# Patient Record
Sex: MALE | Race: BLACK OR AFRICAN AMERICAN | Employment: OTHER | URBAN - METROPOLITAN AREA
[De-identification: names, ages, dates, MRNs, and addresses within clinical notes are randomized per-mention and may not be internally consistent; named-entity substitution may affect disease eponyms.]

---

## 2022-12-19 ENCOUNTER — OFFICE VISIT (OUTPATIENT)
Dept: CARDIOLOGY CLINIC | Age: 72
End: 2022-12-19
Payer: MEDICARE

## 2022-12-19 VITALS
BODY MASS INDEX: 24.12 KG/M2 | HEART RATE: 80 BPM | WEIGHT: 182 LBS | DIASTOLIC BLOOD PRESSURE: 90 MMHG | HEIGHT: 73 IN | SYSTOLIC BLOOD PRESSURE: 147 MMHG

## 2022-12-19 DIAGNOSIS — I42.8 OTHER CARDIOMYOPATHY (HCC): ICD-10-CM

## 2022-12-19 DIAGNOSIS — I10 PRIMARY HYPERTENSION: Primary | ICD-10-CM

## 2022-12-19 PROCEDURE — 1036F TOBACCO NON-USER: CPT | Performed by: INTERNAL MEDICINE

## 2022-12-19 PROCEDURE — G8484 FLU IMMUNIZE NO ADMIN: HCPCS | Performed by: INTERNAL MEDICINE

## 2022-12-19 PROCEDURE — 1123F ACP DISCUSS/DSCN MKR DOCD: CPT | Performed by: INTERNAL MEDICINE

## 2022-12-19 PROCEDURE — 3078F DIAST BP <80 MM HG: CPT | Performed by: INTERNAL MEDICINE

## 2022-12-19 PROCEDURE — G8420 CALC BMI NORM PARAMETERS: HCPCS | Performed by: INTERNAL MEDICINE

## 2022-12-19 PROCEDURE — 93000 ELECTROCARDIOGRAM COMPLETE: CPT | Performed by: INTERNAL MEDICINE

## 2022-12-19 PROCEDURE — 3017F COLORECTAL CA SCREEN DOC REV: CPT | Performed by: INTERNAL MEDICINE

## 2022-12-19 PROCEDURE — 99214 OFFICE O/P EST MOD 30 MIN: CPT | Performed by: INTERNAL MEDICINE

## 2022-12-19 PROCEDURE — 3074F SYST BP LT 130 MM HG: CPT | Performed by: INTERNAL MEDICINE

## 2022-12-19 PROCEDURE — G8427 DOCREV CUR MEDS BY ELIG CLIN: HCPCS | Performed by: INTERNAL MEDICINE

## 2022-12-19 RX ORDER — FOLIC ACID 1 MG/1
1 TABLET ORAL DAILY
COMMUNITY

## 2022-12-19 RX ORDER — ACETAMINOPHEN 500 MG
500 TABLET ORAL EVERY 6 HOURS PRN
COMMUNITY

## 2022-12-19 RX ORDER — PRAZOSIN HYDROCHLORIDE 2 MG/1
2 CAPSULE ORAL NIGHTLY
COMMUNITY

## 2022-12-19 RX ORDER — DONEPEZIL HYDROCHLORIDE 10 MG/1
10 TABLET, FILM COATED ORAL NIGHTLY
COMMUNITY

## 2022-12-19 RX ORDER — TRAZODONE HYDROCHLORIDE 50 MG/1
TABLET ORAL
COMMUNITY
Start: 2022-12-17

## 2022-12-19 RX ORDER — ATORVASTATIN CALCIUM 40 MG/1
40 TABLET, FILM COATED ORAL DAILY
COMMUNITY

## 2022-12-19 RX ORDER — CHOLECALCIFEROL (VITAMIN D3) 1250 MCG
CAPSULE ORAL WEEKLY
COMMUNITY

## 2022-12-19 ASSESSMENT — ENCOUNTER SYMPTOMS
SHORTNESS OF BREATH: 0
BACK PAIN: 0
ABDOMINAL PAIN: 0
DOUBLE VISION: 0
BLOATING: 0
BLURRED VISION: 0
NAUSEA: 0
VOMITING: 0
COUGH: 0
HEMOPTYSIS: 0
ORTHOPNEA: 0

## 2022-12-19 NOTE — PROGRESS NOTES
[facility] CARDIOLOGY  [address] [facility], [address] [facility], [address] [place] [address]  PHONE: [phone]    22    NAME:  Edgar Maza  : 1950  MRN: [id number]         SUBJECTIVE:   Edgar Maza is a 73 y.o. male seen for a visit regarding the following:     Chief Complaint   Patient presents with    Cardiomyopathy    Hypertension       HPI:  (Dr Racquel Gonzalez pt)     No prior history of coronary disease. History of cardiomyopathy (attributed alcohol induced initially from ; states normal cath from ) with echocardiogram from 2019 with normalized left ventricular function; repeat from  with recurrent mild left ventricular dysfunction with ejection fraction stated at 40-45%. Other history of hypertension, hyperlipidemia, diabetes mellitus (last A1C 7.2; 2020). Echo from 2022 with preserved EF and normal diastolic function     No new issues since last visit. States home blood pressures controlled and <130/80. Can walk over a couple flights of stairs; no CP/RIVERA. Mostly impeded by rt knee pain. States being evaluated for possible knee surgery. Has not used Lasix in the last few months  and only uses it as needed. Reports about 7 drinks a week. Prior-- Improved cramps with [facility] daily; told to decrease to once daily dosing but states currently taking 20 mg daily. Previously was not on scheduled diuretics     Prior--States was seen at the [place] 2021 and his Aldactone was stopped and started on Lasix 40 mg twice a day. Has had issues with cramps since then. No subsequent labs done. Uncertain regarding why Lasix was started.   Prior issues with hyperkalemia  on Aldactone but no follow-up labs;  this was stopped and at the [place] and no records sent (requested labs and obtained from 2021 from the [place] with potassium noted at 5.2; creatinine at 1.5]     Previously ---had a call from 10/2020 with noted potassium at 5.6; supposed to repeat labs to rule out possible hemolyzed sample with only increasing Aldactone from 12.5 to 25 mg; prior K around 4.0. Labs from the [place] from 4/22/2021 reviewed with potassium  at 5.2. Appears started drinking again but states not as much as he used to; ~12/week. Difficult historian. Denies any PND/orthopnea. No weight gain. Can do all his ADLs and mostly impeded by knee arthritis. Cardiomyopathy-controlled, alcohol abuse-not controlled, hypertension-controlled    Past Medical History, Past Surgical History, Family history, Social History, and Medications were all reviewed with the patient today and updated as necessary. Allergies   Allergen Reactions    Tetracycline Other (See Comments)     Patient Active Problem List   Diagnosis    Cardiomyopathy ([facility]    Diabetes mellitus type II, uncontrolled    HTN (hypertension), malignant    Chronic systolic heart failure (HCC)    CHF (congestive heart failure) ([facility]     Past Medical History:   Diagnosis Date    Abnormal loss of weight     Cardiomyopathy ([facility] 1/8/2016    CHF (congestive heart failure) ([facility] 1/8/2016    Chronic systolic heart failure ([facility] 1/8/2016    Diabetes mellitus type II, uncontrolled 1/8/2016    HTN (hypertension), malignant 1/8/2016    Hyperlipidemia      No past surgical history on file. No family history on file.   Social History     Tobacco Use    Smoking status: Former    Smokeless tobacco: Never   Substance Use Topics    Alcohol use: No     Current Outpatient Medications   Medication Sig Dispense Refill    acetaminophen (TYLENOL) 500 MG tablet Take 500 mg by mouth every 6 hours as needed for Pain      atorvastatin (LIPITOR) 40 MG tablet Take 40 mg by mouth daily      Cholecalciferol (VITAMIN D3) 1.25 MG (10967 UT) CAPS Take by mouth once a week      donepezil (ARICEPT) 10 MG tablet Take 10 mg by mouth nightly      folic acid (FOLVITE) 1 MG tablet Take 1 mg by mouth daily      prazosin (MINIPRESS) 2 MG capsule Take 2 mg by mouth nightly aspirin 81 MG EC tablet Take 81 mg by mouth daily      carvedilol (COREG) 25 MG tablet Take 12.5 mg by mouth 2 times daily      diazePAM (VALIUM) 2 MG tablet Take 2 mg by mouth every 6 hours as needed. Diclofenac Sodium 3 % GEL Apply topically 2 times daily      fexofenadine-pseudoephedrine (ALLEGRA-D 24HR) 180-240 MG per extended release tablet Take 1 tablet by mouth daily      gabapentin (NEURONTIN) 300 MG capsule Take 300 mg by mouth 2 times daily. hydrocortisone 2.5 % cream Place rectally 4 times daily      insulin glargine (LANTUS) 100 UNIT/ML injection vial Inject into the skin      losartan (COZAAR) 100 MG tablet Take 100 mg by mouth daily      metFORMIN (GLUCOPHAGE) 500 MG tablet Take 1,000 mg by mouth daily (with breakfast)      methocarbamol (ROBAXIN) 750 MG tablet Take by mouth as needed      mirtazapine (REMERON) 15 MG tablet Take 15 mg by mouth      omeprazole (PRILOSEC) 40 MG delayed release capsule Take 40 mg by mouth daily      spironolactone (ALDACTONE) 25 MG tablet Take 12.5 mg by mouth daily      traZODone (DESYREL) 50 MG tablet        No current facility-administered medications for this visit. Review of Systems   Constitutional: Negative for chills, decreased appetite, fever, malaise/fatigue and weight gain. HENT:  Negative for nosebleeds. Eyes:  Negative for blurred vision and double vision. Cardiovascular:  Negative for chest pain, claudication, dyspnea on exertion, leg swelling, orthopnea, palpitations, paroxysmal nocturnal dyspnea and syncope. Respiratory:  Negative for cough, hemoptysis and shortness of breath. Endocrine: Negative for cold intolerance and heat intolerance. Hematologic/Lymphatic: Negative for bleeding problem. Skin:  Negative for rash. Musculoskeletal:  Negative for back pain, joint pain, muscle weakness and myalgias. Gastrointestinal:  Negative for bloating, abdominal pain, nausea and vomiting. Genitourinary:  Negative for dysuria. Neurological:  Negative for dizziness, light-headedness and weakness. Psychiatric/Behavioral:  Negative for altered mental status. PHYSICAL EXAM:    BP (!) 147/90   Pulse 80   Ht 6' 1\" (1.854 m)   Wt 182 lb (82.6 kg)   BMI 24.01 kg/m²      Physical Exam  Constitutional:       Appearance: Normal appearance. HENT:      Head: Normocephalic and atraumatic. Mouth/Throat:      Mouth: Mucous membranes are moist.   Eyes:      Pupils: Pupils are equal, round, and reactive to light. Cardiovascular:      Rate and Rhythm: Normal rate and regular rhythm. Pulses: Normal pulses. Pulmonary:      Effort: Pulmonary effort is normal.      Breath sounds: Normal breath sounds. Abdominal:      General: Bowel sounds are normal. There is no distension. Palpations: Abdomen is soft. Tenderness: There is no abdominal tenderness. Musculoskeletal:         General: No swelling. Cervical back: Normal range of motion. Skin:     General: Skin is warm and dry. Neurological:      General: No focal deficit present. Mental Status: He is alert and oriented to person, place, and time. Medical problems and test results were reviewed with the patient today. No results found for this or any previous visit (from the past 672 hour(s)). No results found for: CHOL, CHOLPOCT, CHOLX, CHLST, CHOLV, HDL, HDLPOC, HDLC, LDL, LDLC, VLDLC, VLDL, TGLX, TRIGL    No results found for any visits on 12/19/22. ASSESSMENT and PLAN    Isabella Martinez was seen today for cardiomyopathy and hypertension. 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  Denies any symptoms. Stressed need to refrain from alcohol with recurrent left ventricular dysfunction. Continue Coreg/losartan 100mg daily.   Increased Coreg dose to 25  mg twice daily. Increased Aldactone to 25 mg daily. Continue as needed Lasix. [facility]. Furthermore, repeat echo with preserved EF and normal diastolic function. Hypertension-controlled per home reads. Target less than 130/80. Repeat BMP reviewed from 11/2022 in [facility] and stable (appears previously with worsening creatinine when started on Lasix per the [place])     Alcohol abuse-stressed cessation     Other-last LDL noted at 61 from 4/22/2021; last BMP from 11/2022 with Cr 1.4; K at 4.9       Return in about 1 year (around 12/19/2023).      Lolita Dunne MD  12/19/2022  12:13 PM

## 2023-08-09 NOTE — TELEPHONE ENCOUNTER
Pt needs refill on Spironolactone 25 mg sent to [address] [place] [address] in [place]. Please call pt with any questions. Thank you.

## 2023-08-10 RX ORDER — SPIRONOLACTONE 25 MG/1
25 TABLET ORAL DAILY
Qty: 90 TABLET | Refills: 3 | Status: SHIPPED | OUTPATIENT
Start: 2023-08-10

## 2023-08-14 NOTE — TELEPHONE ENCOUNTER
Pt's prescription for spironolactone should have been sent to [facility] in [place].   (Not [facility])

## 2023-08-15 RX ORDER — SPIRONOLACTONE 25 MG/1
25 TABLET ORAL DAILY
Qty: 90 TABLET | Refills: 3 | Status: SHIPPED | OUTPATIENT
Start: 2023-08-15

## 2023-08-15 NOTE — TELEPHONE ENCOUNTER
Requested Prescriptions     Pending Prescriptions Disp Refills    spironolactone (ALDACTONE) 25 MG tablet 90 tablet 3     Sig: Take 1 tablet by mouth daily

## 2023-10-13 NOTE — TELEPHONE ENCOUNTER
Requested Prescriptions     Pending Prescriptions Disp Refills    carvedilol (COREG) 25 MG tablet 60 tablet      Sig: Take 0.5 tablets by mouth 2 times daily    spironolactone (ALDACTONE) 25 MG tablet 90 tablet 3     Sig: Take 1 tablet by mouth daily

## 2023-10-16 RX ORDER — CARVEDILOL 25 MG/1
12.5 TABLET ORAL 2 TIMES DAILY
Qty: 60 TABLET | Refills: 11 | Status: SHIPPED | OUTPATIENT
Start: 2023-10-16

## 2023-10-16 RX ORDER — SPIRONOLACTONE 25 MG/1
25 TABLET ORAL DAILY
Qty: 90 TABLET | Refills: 3 | Status: SHIPPED | OUTPATIENT
Start: 2023-10-16

## 2024-08-01 ENCOUNTER — TELEPHONE (OUTPATIENT)
Age: 74
End: 2024-08-01

## 2024-08-01 NOTE — TELEPHONE ENCOUNTER
T,C. From pt c/o been having more episodes of SOB no other symptoms noted. Wants to make an appt with Dr Castillo. Appt given for 8/16/24 at 1:00. Advised pt if SOB gets worse can see PCP and he thinks we need to do anything else just let us know. Pt said and verbalized understand

## 2024-08-16 ENCOUNTER — OFFICE VISIT (OUTPATIENT)
Age: 74
End: 2024-08-16
Payer: MEDICARE

## 2024-08-16 VITALS
WEIGHT: 183 LBS | DIASTOLIC BLOOD PRESSURE: 64 MMHG | BODY MASS INDEX: 24.25 KG/M2 | HEIGHT: 73 IN | SYSTOLIC BLOOD PRESSURE: 128 MMHG | HEART RATE: 77 BPM

## 2024-08-16 DIAGNOSIS — I42.8 OTHER CARDIOMYOPATHY (HCC): Primary | ICD-10-CM

## 2024-08-16 DIAGNOSIS — E78.2 HYPERLIPIDEMIA, MIXED: ICD-10-CM

## 2024-08-16 DIAGNOSIS — I10 PRIMARY HYPERTENSION: ICD-10-CM

## 2024-08-16 PROCEDURE — 1123F ACP DISCUSS/DSCN MKR DOCD: CPT | Performed by: INTERNAL MEDICINE

## 2024-08-16 PROCEDURE — 93000 ELECTROCARDIOGRAM COMPLETE: CPT | Performed by: INTERNAL MEDICINE

## 2024-08-16 PROCEDURE — 3074F SYST BP LT 130 MM HG: CPT | Performed by: INTERNAL MEDICINE

## 2024-08-16 PROCEDURE — 99214 OFFICE O/P EST MOD 30 MIN: CPT | Performed by: INTERNAL MEDICINE

## 2024-08-16 PROCEDURE — 3078F DIAST BP <80 MM HG: CPT | Performed by: INTERNAL MEDICINE

## 2024-08-16 ASSESSMENT — ENCOUNTER SYMPTOMS
DOUBLE VISION: 0
SHORTNESS OF BREATH: 0
ORTHOPNEA: 0
VOMITING: 0
NAUSEA: 0
BACK PAIN: 0
BLURRED VISION: 0
ABDOMINAL PAIN: 0
BLOATING: 0
HEMOPTYSIS: 0

## 2024-08-16 NOTE — PROGRESS NOTES
[facility] CARDIOLOGY  [address] [place] [address], [address]  [place] [address]  PHONE: [phone]    24    NAME:  Rick Brown  : 1950  MRN: [id number]         SUBJECTIVE:   Rick Brown is a 73 y.o. male seen for a visit regarding the following:     Chief Complaint   Patient presents with    Hypertension    Shortness of Breath       HPI:  (Dr JUAN CARLOS Peace pt)     No prior history of coronary disease.  History of cardiomyopathy (attributed alcohol induced initially from ; states normal cath from ) with echocardiogram from 2019 with normalized left ventricular function; repeat from  with recurrent mild left ventricular dysfunction with ejection fraction stated at 40-45%.  Other history of hypertension, hyperlipidemia, diabetes mellitus (last A1C 7.2; 2020).  Echo from 2022 with preserved EF and normal diastolic function     No new issues since last visit.  States home blood pressures controlled and <130/80. Can walk over a couple flights of stairs; no CP/RIVERA. Mostly impeded by rt knee pain.  States being evaluated for possible knee surgery.  Denies any PND/orthopnea.  No syncope.    Has not used Lasix in the last few months  and only uses it as needed.  Reports less than 7 drinks a week.     Prior-- Improved cramps with decreasing [facility] twice daily; told to decrease to once daily dosing but states currently taking 20 mg daily.  Previously was not on scheduled diuretics     Prior--[facility] and his Aldactone was stopped and started on Lasix 40 mg twice a day.  Has had issues with cramps since then.  No subsequent labs done.  Uncertain regarding why Lasix was started.  Prior issues with hyperkalemia  on Aldactone but no follow-up labs;  [facility] and [facility] ([facility] with potassium noted at 5.2; creatinine at 1.5]     Previously ---had a call from 10/2020

## 2025-02-25 ENCOUNTER — TELEPHONE (OUTPATIENT)
Age: 75
End: 2025-02-25

## 2025-02-25 NOTE — TELEPHONE ENCOUNTER
Patient has yearly appointment in August but will have a knee replacement end of March. Does he need an appointment to be cleared for surgery? Please call